# Patient Record
Sex: MALE | Race: WHITE | NOT HISPANIC OR LATINO | Employment: STUDENT | URBAN - METROPOLITAN AREA
[De-identification: names, ages, dates, MRNs, and addresses within clinical notes are randomized per-mention and may not be internally consistent; named-entity substitution may affect disease eponyms.]

---

## 2022-06-24 ENCOUNTER — OCCUPATIONAL HEALTH (OUTPATIENT)
Dept: URGENT CARE | Facility: CLINIC | Age: 22
End: 2022-06-24
Payer: COMMERCIAL

## 2022-06-24 DIAGNOSIS — Z11.1 ENCOUNTER FOR PPD SKIN TEST READING: Primary | ICD-10-CM

## 2022-06-24 PROCEDURE — 86580 POCT TB SKIN TEST: ICD-10-PCS | Mod: ,,, | Performed by: PHYSICIAN ASSISTANT

## 2022-06-24 PROCEDURE — 86580 TB INTRADERMAL TEST: CPT | Mod: ,,, | Performed by: PHYSICIAN ASSISTANT

## 2022-06-24 NOTE — PROGRESS NOTES
Patient presents to clinic for administration of TB skin test. Consent was completed. No contraindications existed for administration of TB skin test. TB skin test administered. Patient was advised to return to clinic in 48-72 hours to have TB skin test read.

## 2022-06-27 ENCOUNTER — OCCUPATIONAL HEALTH (OUTPATIENT)
Dept: URGENT CARE | Facility: CLINIC | Age: 22
End: 2022-06-27
Payer: COMMERCIAL

## 2022-06-27 LAB
TB INDURATION - 48 HR READ: NORMAL
TB INDURATION - 72 HR READ: 0 MM
TB SKIN TEST - 48 HR READ: NORMAL
TB SKIN TEST - 72 HR READ: NEGATIVE

## 2022-06-27 PROCEDURE — 99211 PR OFFICE/OUTPT VISIT, EST, LEVL I: ICD-10-PCS | Mod: S$PBB,,, | Performed by: FAMILY MEDICINE

## 2022-06-27 PROCEDURE — 99211 OFF/OP EST MAY X REQ PHY/QHP: CPT | Mod: S$PBB,,, | Performed by: FAMILY MEDICINE

## 2022-06-27 NOTE — PROGRESS NOTES
Pt presents to clinic for TB skin test read after 48-72 hours. Pt result read and documented as negative with 0mm induration. Documentation printed and given to pt. Pt left clinic in good spirits with no questions or concerns.

## 2023-05-26 ENCOUNTER — HOSPITAL ENCOUNTER (EMERGENCY)
Facility: HOSPITAL | Age: 23
Discharge: HOME | End: 2023-05-26
Attending: EMERGENCY MEDICINE
Payer: COMMERCIAL

## 2023-05-26 ENCOUNTER — OFFICE VISIT (OUTPATIENT)
Dept: URGENT CARE | Facility: CLINIC | Age: 23
End: 2023-05-26
Payer: COMMERCIAL

## 2023-05-26 VITALS
HEART RATE: 62 BPM | DIASTOLIC BLOOD PRESSURE: 88 MMHG | RESPIRATION RATE: 17 BRPM | OXYGEN SATURATION: 98 % | SYSTOLIC BLOOD PRESSURE: 128 MMHG | TEMPERATURE: 98 F

## 2023-05-26 VITALS
RESPIRATION RATE: 18 BRPM | TEMPERATURE: 98 F | DIASTOLIC BLOOD PRESSURE: 85 MMHG | BODY MASS INDEX: 27.59 KG/M2 | HEART RATE: 80 BPM | WEIGHT: 215 LBS | OXYGEN SATURATION: 98 % | HEIGHT: 74 IN | SYSTOLIC BLOOD PRESSURE: 131 MMHG

## 2023-05-26 DIAGNOSIS — R07.9 CHEST PAIN, UNSPECIFIED TYPE: Primary | ICD-10-CM

## 2023-05-26 DIAGNOSIS — R07.9 CHEST PAIN: ICD-10-CM

## 2023-05-26 DIAGNOSIS — R94.31 ABNORMAL EKG: ICD-10-CM

## 2023-05-26 DIAGNOSIS — R07.89 CHEST WALL PAIN: Primary | ICD-10-CM

## 2023-05-26 LAB
ALBUMIN SERPL-MCNC: 4.5 G/DL (ref 3.5–5)
ALBUMIN/GLOB SERPL: 1.5 RATIO (ref 1.1–2)
ALP SERPL-CCNC: 81 UNIT/L (ref 40–150)
ALT SERPL-CCNC: 34 UNIT/L (ref 0–55)
AST SERPL-CCNC: 32 UNIT/L (ref 5–34)
BASOPHILS # BLD AUTO: 0.03 X10(3)/MCL
BASOPHILS NFR BLD AUTO: 0.5 %
BILIRUBIN DIRECT+TOT PNL SERPL-MCNC: 0.9 MG/DL
BUN SERPL-MCNC: 13.1 MG/DL (ref 8.9–20.6)
CALCIUM SERPL-MCNC: 9.4 MG/DL (ref 8.4–10.2)
CHLORIDE SERPL-SCNC: 106 MMOL/L (ref 98–107)
CO2 SERPL-SCNC: 25 MMOL/L (ref 22–29)
CREAT SERPL-MCNC: 0.97 MG/DL (ref 0.73–1.18)
EKG 12-LEAD: NORMAL
EOSINOPHIL # BLD AUTO: 0.06 X10(3)/MCL (ref 0–0.9)
EOSINOPHIL NFR BLD AUTO: 1 %
ERYTHROCYTE [DISTWIDTH] IN BLOOD BY AUTOMATED COUNT: 13 % (ref 11.5–17)
GFR SERPLBLD CREATININE-BSD FMLA CKD-EPI: >60 MLS/MIN/1.73/M2
GLOBULIN SER-MCNC: 3 GM/DL (ref 2.4–3.5)
GLUCOSE SERPL-MCNC: 90 MG/DL (ref 74–100)
HCT VFR BLD AUTO: 48.1 % (ref 42–52)
HGB BLD-MCNC: 15.9 G/DL (ref 14–18)
IMM GRANULOCYTES # BLD AUTO: 0.01 X10(3)/MCL (ref 0–0.04)
IMM GRANULOCYTES NFR BLD AUTO: 0.2 %
LYMPHOCYTES # BLD AUTO: 1.8 X10(3)/MCL (ref 0.6–4.6)
LYMPHOCYTES NFR BLD AUTO: 30 %
MCH RBC QN AUTO: 28.7 PG (ref 27–31)
MCHC RBC AUTO-ENTMCNC: 33.1 G/DL (ref 33–36)
MCV RBC AUTO: 86.8 FL (ref 80–94)
MONOCYTES # BLD AUTO: 0.61 X10(3)/MCL (ref 0.1–1.3)
MONOCYTES NFR BLD AUTO: 10.2 %
NEUTROPHILS # BLD AUTO: 3.49 X10(3)/MCL (ref 2.1–9.2)
NEUTROPHILS NFR BLD AUTO: 58.1 %
NRBC BLD AUTO-RTO: 0 %
PLATELET # BLD AUTO: 226 X10(3)/MCL (ref 130–400)
PMV BLD AUTO: 9.8 FL (ref 7.4–10.4)
POTASSIUM SERPL-SCNC: 4.4 MMOL/L (ref 3.5–5.1)
PR INTERVAL: NORMAL
PROT SERPL-MCNC: 7.5 GM/DL (ref 6.4–8.3)
PRT AXES: NORMAL
QRS DURATION: NORMAL
QT/QTC: NORMAL
RBC # BLD AUTO: 5.54 X10(6)/MCL (ref 4.7–6.1)
SODIUM SERPL-SCNC: 138 MMOL/L (ref 136–145)
TROPONIN I SERPL-MCNC: <0.01 NG/ML (ref 0–0.04)
VENTRICULAR RATE: NORMAL
WBC # SPEC AUTO: 6 X10(3)/MCL (ref 4.5–11.5)

## 2023-05-26 PROCEDURE — 25500020 PHARM REV CODE 255: Performed by: PHYSICIAN ASSISTANT

## 2023-05-26 PROCEDURE — 80053 COMPREHEN METABOLIC PANEL: CPT | Performed by: PHYSICIAN ASSISTANT

## 2023-05-26 PROCEDURE — 99213 OFFICE O/P EST LOW 20 MIN: CPT | Mod: ,,, | Performed by: FAMILY MEDICINE

## 2023-05-26 PROCEDURE — 93000 POCT EKG 12-LEAD: ICD-10-PCS | Mod: ,,, | Performed by: FAMILY MEDICINE

## 2023-05-26 PROCEDURE — 63600175 PHARM REV CODE 636 W HCPCS: Performed by: EMERGENCY MEDICINE

## 2023-05-26 PROCEDURE — 99285 EMERGENCY DEPT VISIT HI MDM: CPT | Mod: 25

## 2023-05-26 PROCEDURE — 85025 COMPLETE CBC W/AUTO DIFF WBC: CPT | Performed by: PHYSICIAN ASSISTANT

## 2023-05-26 PROCEDURE — 93000 ELECTROCARDIOGRAM COMPLETE: CPT | Mod: ,,, | Performed by: FAMILY MEDICINE

## 2023-05-26 PROCEDURE — 96374 THER/PROPH/DIAG INJ IV PUSH: CPT

## 2023-05-26 PROCEDURE — 84484 ASSAY OF TROPONIN QUANT: CPT | Performed by: PHYSICIAN ASSISTANT

## 2023-05-26 PROCEDURE — 99213 PR OFFICE/OUTPT VISIT, EST, LEVL III, 20-29 MIN: ICD-10-PCS | Mod: ,,, | Performed by: FAMILY MEDICINE

## 2023-05-26 PROCEDURE — 93005 ELECTROCARDIOGRAM TRACING: CPT

## 2023-05-26 RX ORDER — KETOROLAC TROMETHAMINE 30 MG/ML
30 INJECTION, SOLUTION INTRAMUSCULAR; INTRAVENOUS
Status: COMPLETED | OUTPATIENT
Start: 2023-05-26 | End: 2023-05-26

## 2023-05-26 RX ORDER — INDOMETHACIN 50 MG/1
50 CAPSULE ORAL
Qty: 15 CAPSULE | Refills: 0 | Status: SHIPPED | OUTPATIENT
Start: 2023-05-26

## 2023-05-26 RX ADMIN — IOPAMIDOL 100 ML: 755 INJECTION, SOLUTION INTRAVENOUS at 01:05

## 2023-05-26 RX ADMIN — KETOROLAC TROMETHAMINE 30 MG: 30 INJECTION, SOLUTION INTRAMUSCULAR; INTRAVENOUS at 03:05

## 2023-05-26 NOTE — ED PROVIDER NOTES
"Encounter Date: 5/26/2023    SCRIBE #1 NOTE: I, Marely Diggs, am scribing for, and in the presence of,  Kishan Barrow III, MD. I have scribed the following portions of the note - Other sections scribed: HPI,ROS,PE.     History     Chief Complaint   Patient presents with    Chest Pain     Pt c/o cp x3 days. Reports pain worse with deep breath, denies sob. Sent here by  for PE workup. Denies recent cough.      23-year-old  male with no past medical history presents to ED with mother nearby c/o constant CP onset 3x days. Pt reports recent trip to [place], states on the first night there woke with CP. States certain positions such as lying supine or on the left side would exacerbate pain as well as deep inspiration, laughing, or minimal exertion. He reports taking acetaminophen with mild relief. He denies N/V/D, wheezing, and leg swelling. Mother notes grandfather with hx of blood clots. C    The history is provided by the patient. No  was used.   Chest Pain  The current episode started several days ago. Chest pain occurs constantly. The chest pain is unchanged. The pain is associated with breathing, coughing and exertion. Quality: "Pain" Chest pain is worsened by certain positions and deep breathing. Pertinent negatives for primary symptoms include no shortness of breath, no wheezing, no nausea and no vomiting. There are no known risk factors.   Review of patient's allergies indicates:  No Known Allergies  Past Medical History:   Diagnosis Date    Known health problems: none      Past Surgical History:   Procedure Laterality Date    TONSILLECTOMY       No family history on file.  Social History     Tobacco Use    Smoking status: Never    Smokeless tobacco: Never     Review of Systems   Respiratory:  Negative for shortness of breath and wheezing.    Cardiovascular:  Positive for chest pain. Negative for leg swelling.   Gastrointestinal:  Negative for diarrhea, nausea and vomiting.   Musculoskeletal:  " Negative for myalgias.     Physical Exam     Initial Vitals [05/26/23 1236]   BP Pulse Resp Temp SpO2   (!) 153/94 64 18 97.8 °F (36.6 °C) 97 %      MAP       --         Physical Exam    Nursing note and vitals reviewed.  Constitutional: No distress.   HENT:   Head: Normocephalic and atraumatic.   Neck: Trachea normal.   Cardiovascular:  Normal rate and regular rhythm.           No murmur heard.  Pulmonary/Chest: Breath sounds normal. No respiratory distress.   Abdominal: Abdomen is soft. Bowel sounds are normal. He exhibits no distension. There is no abdominal tenderness.   Musculoskeletal:         General: Normal range of motion.      Lumbar back: Normal.     Neurological: He is alert and oriented to person, place, and time. He has normal strength. No cranial nerve deficit.   Skin: Skin is warm and dry. No rash noted.   Psychiatric: He has a normal mood and affect. Judgment normal.       ED Course   Procedures  Labs Reviewed   TROPONIN I - Normal   CBC W/ AUTO DIFFERENTIAL    Narrative:     The following orders were created for panel order CBC auto differential.  Procedure                               Abnormality         Status                     ---------                               -----------         ------                     CBC with Differential[[id number]]                            Final result                 Please view results for these tests on the individual orders.   COMPREHENSIVE METABOLIC PANEL   CBC WITH DIFFERENTIAL     EKG Readings: (Independently Interpreted)   Initial Reading: No STEMI. Previous EKG: Compared with most recent EKG Rhythm: Normal Sinus Rhythm. Heart Rate: 73. Ectopy: No Ectopy. Conduction: Normal. ST Segments: Normal ST Segments. T Waves: Normal.   EKG done at 1236:    ECG Results              EKG 12-lead (Final result)  Result time 05/26/23 15:14:18      Final result by Interface, Lab In [facility] (05/26/23 15:14:18)                   Narrative:    Test Reason :  R07.9,    Vent. Rate : 073 BPM     Atrial Rate : 073 BPM     P-R Int : 144 ms          QRS Dur : 098 ms      QT Int : 374 ms       P-R-T Axes : 089 108 041 degrees     QTc Int : 412 ms    Normal sinus rhythm  Rightward axis  Borderline Abnormal ECG  No previous ECGs available  Confirmed by David Ogden MD ([id number]) on 5/26/2023 3:14:11 PM    Referred By:             Confirmed By:David Ogden MD                                  Imaging Results              CTA Chest Non-Coronary (PE Studies) (Final result)  Result time 05/26/23 13:14:30      Final result by Mk Daniel MD (05/26/23 13:14:30)                   Narrative:    EXAMINATION  CTA CHEST NON CORONARY (PE STUDIES)    CLINICAL HISTORY  Pulmonary embolism (PE) suspected, high prob;    TECHNIQUE  Post-contrast helical-acquisition CT images were obtained, with bolus timing to pulmonary arterial system for purposes of PE protocol CTA.  Multiplanar reconstructions, including MIP images, were accomplished by a CT technologist at a separate workstation, pushed to PACS for physician review.    CONTRAST  IV: ISOVUE-370, 100 mL    COMPARISON  None available at the time of initial interpretation.    FINDINGS  Images were reviewed in soft tissue, lung, and bone windows.    Exam quality: adequate for evaluation    Lines/tubes: none visualized    Pulmonary Vessels: No central or large segmental filling defect.    Cardiovascular: No suggestion of right heart strain; the RV:LV ratio is <1. No significant heart chamber enlargement. There is no pericardial effusion. No focal contour irregularity or intraluminal abnormality is appreciated involving the visualized aorta and branch vessels.    Lungs/Pleura: Central airways are patent. No acute airspace consolidation or suspicious focal lesion. No pleural thickening, significant effusion, or evidence of pneumothorax.    Other Findings: No pathologic makayla enlargement or necrotic process. The visualized thyroid is unremarkable.  No acute or suspicious focal abnormality through the visualized upper abdomen. No abnormality of the thoracic wall soft tissues. No acute osseous displacement or destructive focal lesion.    IMPRESSION  No CT evidence of pulmonary thromboembolism or other acute intrathoracic process.    RADIATION DOSE  Automated tube current modulation, weight-based exposure dosing, and/or iterative reconstruction technique utilized to reach lowest reasonably achievable exposure rate.    DLP: 355 mGy*cm      Electronically signed by: Mk Daniel  Date:    05/26/2023  Time:    13:14                                     Medications   iopamidoL (ISOVUE-370) injection 100 mL (100 mLs Intravenous Given 5/26/23 1307)   ketorolac injection 30 mg (30 mg Intravenous Given 5/26/23 1535)     Medical Decision Making  The differential diagnosis includes, but is not limited to: pleurisy, PE, Nonspecific chest pain, and myocarditis.     EKG without abnormality cardiac enzymes also normal rules out myocarditis CT chest done secondary to risk factors of recent long trip and acute chest pain in his negative there is a pleuritic nature of the pain most likely musculoskeletal pain no vital sign abnormality normal exam resting comfortably will place on anti-inflammatories and discharge    Amount and/or Complexity of Data Reviewed  Independent Historian: parent     Details: Mother notes grandfather had hx of blood clots.  Labs: ordered. Decision-making details documented in ED Course.  Radiology: ordered. Decision-making details documented in ED Course.  ECG/medicine tests: ordered. Decision-making details documented in ED Course.              Scribe Attestation:   Scribe #1: I performed the above scribed service and the documentation accurately describes the services I performed. I attest to the accuracy of the note.    Attending Attestation:           Physician Attestation for Scribe:  Physician Attestation Statement for Scribe #1: IKishan  MD KEVIN, reviewed documentation, as scribed by Marely Diggs in my presence, and it is both accurate and complete.                        Clinical Impression:   Final diagnoses:  [R07.9] Chest pain  [R07.89] Chest wall pain (Primary)        ED Disposition Condition    Discharge Stable          ED Prescriptions       Medication Sig Dispense Start Date End Date Auth. Provider    indomethacin (INDOCIN) 50 MG capsule Take 1 capsule (50 mg total) by mouth 3 (three) times daily with meals. 15 capsule 5/26/2023 -- Kishan Barrow III, MD          Follow-up Information       Follow up With Specialties Details Why Contact Info    PCP  In 3 days      [facility]  In 1 week if you do not have a primary care you may call this number to [facility] one Please call [phone] to get established with a primary care             Kishan Barrow III, MD  05/26/23 7897

## 2023-05-26 NOTE — FIRST PROVIDER EVALUATION
Medical screening examination initiated.  I have conducted a focused provider triage encounter, findings are as follows:    Brief history of present illness:  23-year-old male presents to ED with left-sided chest pain just below the nipple area for the past 2 days.  States recently drove to [place] when went to bed and woke up with the pain.  Denies any trauma injury or fall.  Denies any shortness of breath associated with the chest pain.  States deep inspiration and certain movements make the pain worse.  Patient denies any smoking history. Seen at  where EKG was discussed with cardiology and sent to rule out PE.    Vitals:    05/26/23 1236   BP: (!) 153/94   Pulse: 64   Resp: 18   Temp: 97.8 °F (36.6 °C)   TempSrc: Oral   SpO2: 97%       Pertinent physical exam:  Patient is awake and alert and oriented.  Ambulatory to triage.  In no acute distress.      Brief workup plan:  labs, EKG, cxr in chart, CTA    Preliminary workup initiated; this workup will be continued and followed by the physician or advanced practice provider that is assigned to the patient when roomed.

## 2023-05-26 NOTE — PROGRESS NOTES
"Subjective:      Patient ID: Rica Curran is a 23 y.o. male.    Vitals:  height is 6' 2" (1.88 m) and weight is 97.5 kg (215 lb). His oral temperature is 97.9 °F (36.6 °C). His blood pressure is 131/85 and his pulse is 80. His respiration is 18 and oxygen saturation is 98%.     Chief Complaint: Chest Pain    23-year-old male presents to clinic complaining of a 2 day history of left-sided chest pain just below the nipple area.  States he drove to [place] went to bed and woke up with the pain.  Denies any trauma injury or fall.  Denies any shortness of breath associated with the chest pain.  States deep inspiration and certain movements make the pain worse.  Pain is constant.  Patient denies any smoking history..  Smoking history.  No recent athletic or sporting competitions.  States she is also feeling some discomfort on the left side over his back as well.  Denies any rash in this area      Constitution: Negative.   HENT: Negative.     Neck: neck negative.   Cardiovascular:  Positive for chest pain.   Eyes: Negative.    Respiratory: Negative.     Gastrointestinal: Negative.    Genitourinary: Negative.    Musculoskeletal: Negative.    Skin: Negative.    Allergic/Immunologic: Negative.    Neurological: Negative.    Hematologic/Lymphatic: Negative.     Objective:     Physical Exam   Constitutional: He is oriented to person, place, and time.  Non-toxic appearance. He does not appear ill. No distress.   HENT:   Head: Normocephalic and atraumatic.   Eyes: Conjunctivae are normal.   Cardiovascular: Normal rate.   Pulmonary/Chest: Effort normal. No stridor. No respiratory distress. He has no wheezes. He has no rhonchi. He has no rales. He exhibits no tenderness (pain is not reproducible with palpation).   Abdominal: Normal appearance.   Neurological: He is alert and oriented to person, place, and time.   Skin: Skin is not diaphoretic and no rash.   Psychiatric: His behavior is normal. Mood, judgment and thought content " "normal.   Vitals reviewed.       Previous History      Review of patient's allergies indicates:  No Known Allergies    Past Medical History:   Diagnosis Date    Known health problems: none      No current outpatient medications  Past Surgical History:   Procedure Laterality Date    TONSILLECTOMY       History reviewed. No pertinent family history.    Social History     Tobacco Use    Smoking status: Never    Smokeless tobacco: Never        Physical Exam      Vital Signs Reviewed   /85   Pulse 80   Temp 97.9 °F (36.6 °C) (Oral)   Resp 18   Ht 6' 2" (1.88 m)   Wt 97.5 kg (215 lb)   SpO2 98%   BMI 27.60 kg/m²        Procedures    Procedures     Labs     Results for orders placed or performed in visit on 05/26/23   POCT EKG 12-LEAD (NOT FOR [facility] USE)   Result Value Ref Range    EKG 12-Lead      Ventricular Rate      [place] Interval      QRS Duration      QT/QTc      PRT Axes         Assessment:     1. Chest pain, unspecified type    2. Abnormal EKG        Plan:   Recommend further evaluation in the emergency department of your choice  Chest x-ray negative  EKG right axis deviation consider right ventricular strain/pulmonary disease  R/u PE  Discussed with cardiology  Chest pain, unspecified type  -     POCT EKG 12-LEAD (NOT FOR [facility] USE)  -     X-Ray Chest PA And Lateral; Future; Expected date: 05/26/2023    Abnormal EKG                    "

## 2023-07-18 ENCOUNTER — OFFICE VISIT (OUTPATIENT)
Dept: URGENT CARE | Facility: CLINIC | Age: 23
End: 2023-07-18
Payer: COMMERCIAL

## 2023-07-18 VITALS
BODY MASS INDEX: 27.59 KG/M2 | DIASTOLIC BLOOD PRESSURE: 80 MMHG | HEIGHT: 74 IN | RESPIRATION RATE: 20 BRPM | WEIGHT: 215 LBS | SYSTOLIC BLOOD PRESSURE: 125 MMHG | OXYGEN SATURATION: 98 % | HEART RATE: 67 BPM | TEMPERATURE: 98 F

## 2023-07-18 DIAGNOSIS — L30.9 ECZEMA OF BOTH HANDS: Primary | ICD-10-CM

## 2023-07-18 PROCEDURE — 99203 PR OFFICE/OUTPT VISIT, NEW, LEVL III, 30-44 MIN: ICD-10-PCS | Mod: ,,, | Performed by: PHYSICIAN ASSISTANT

## 2023-07-18 PROCEDURE — 99203 OFFICE O/P NEW LOW 30 MIN: CPT | Mod: ,,, | Performed by: PHYSICIAN ASSISTANT

## 2023-07-18 RX ORDER — TACROLIMUS 1 MG/G
OINTMENT TOPICAL 2 TIMES DAILY
Qty: 30 G | Refills: 1 | Status: SHIPPED | OUTPATIENT
Start: 2023-07-18 | End: 2023-07-21

## 2023-07-18 RX ORDER — METHYLPREDNISOLONE 4 MG/1
TABLET ORAL
Qty: 1 EACH | Refills: 0 | Status: SHIPPED | OUTPATIENT
Start: 2023-07-18 | End: 2023-08-10

## 2023-07-18 RX ORDER — SULFAMETHOXAZOLE AND TRIMETHOPRIM 800; 160 MG/1; MG/1
1 TABLET ORAL 2 TIMES DAILY
Qty: 20 TABLET | Refills: 0 | Status: SHIPPED | OUTPATIENT
Start: 2023-07-18 | End: 2023-07-28

## 2023-07-18 NOTE — PROGRESS NOTES
"Subjective:      Patient ID: Rica Curran is a 23 y.o. male.    Vitals:  height is 6' 2" (1.88 m) and weight is 97.5 kg (215 lb). His temperature is 98.4 °F (36.9 °C). His blood pressure is 125/80 and his pulse is 67. His respiration is 20 and oxygen saturation is 98%.     Chief Complaint: Eczema (Pt symptoms starting flaring up 3-4  weeks ago, pt needs new prescription. )    HPI  right-hand dominant patient with long history of eczema reports running out of Protopic Rx cream states in the last several weeks having sweating wet hands with flare-up clear yellow weeping of bilateral hand rash presents to urgent care for medication refill.   Eczema     Additional comments: Pt symptoms starting flaring up 3-4  weeks ago, pt   needs new prescription.            Constitution: Negative for chills and fever.   Musculoskeletal:  Negative for joint pain and muscle ache.   Skin:  Positive for rash. Negative for skin thickening/induration, erythema and abscess.   Allergic/Immunologic: Negative for immunocompromised state.   Neurological:  Negative for numbness and tingling.    Objective:     Physical Exam   Constitutional: He is oriented to person, place, and time. He appears well-developed.  Non-toxic appearance. He does not appear ill. No distress.      Comments:Awake alert pleasant ambulatory male     HENT:   Head: Normocephalic. Head is without abrasion, without contusion and without laceration.   Mouth/Throat: Oropharynx is clear and moist and mucous membranes are normal.   Eyes: EOM and lids are normal.   Neck: Trachea normal and phonation normal. Neck supple.   Cardiovascular: Normal rate, regular rhythm and normal pulses.   Pulmonary/Chest: Effort normal. No respiratory distress.   Abdominal: Normal appearance.   Musculoskeletal: Normal range of motion.         General: No swelling, tenderness or deformity. Normal range of motion.        Hands:    Neurological: no focal deficit. He is alert and oriented to person, place, " and time.   Skin: Skin is warm, dry, intact, not diaphoretic and rash. Capillary refill takes less than 2 seconds. No abrasion, No burn, No erythema and No ecchymosis         Comments: Left-hand greater than right hand interdigit eczema rash flare-up purulent drainage no induration no fluctuance no sign of infection   Psychiatric: His speech is normal and behavior is normal. Mood, judgment and thought content normal.   Nursing note and vitals reviewed.    Assessment:     1. Eczema of both hands        Plan:     May restart Protopic cream to help reduce eczema flare.  May add oral steroid to help reduce eczema flare-up.  If fever develops or concern for cellulitis bacterial infection in the next 3-5 days recommend hold and wait Bactrim antibiotic backup coverage.  Recommend follow-up with primary care physician or dermatologist for continued long-term eczema care planning.  Eczema of both hands    Other orders  -     methylPREDNISolone (MEDROL DOSEPACK) 4 mg tablet; use as directed  Dispense: 1 each; Refill: 0  -     tacrolimus (PROTOPIC) 0.1 % ointment; Apply topically 2 (two) times daily. for 14 days  Dispense: 30 g; Refill: 1  -     sulfamethoxazole-trimethoprim 800-160mg (BACTRIM DS) 800-160 mg Tab; Take 1 tablet by mouth 2 (two) times daily. for 10 days  Dispense: 20 tablet; Refill: 0

## 2023-07-18 NOTE — PATIENT INSTRUCTIONS
May restart Protopic cream to help reduce eczema flare.  May add oral steroid to help reduce eczema flare-up.  If fever develops or concern for cellulitis bacterial infection in the next 3-5 days recommend hold and wait Bactrim antibiotic backup coverage.  Recommend follow-up with primary care physician or dermatologist for continued long-term eczema care planning.

## 2023-07-21 RX ORDER — TACROLIMUS 1 MG/G
OINTMENT TOPICAL
Qty: 30 G | Refills: 1 | Status: SHIPPED | OUTPATIENT
Start: 2023-07-21

## 2023-08-10 ENCOUNTER — OFFICE VISIT (OUTPATIENT)
Dept: URGENT CARE | Facility: CLINIC | Age: 23
End: 2023-08-10
Payer: COMMERCIAL

## 2023-08-10 VITALS
HEART RATE: 65 BPM | RESPIRATION RATE: 12 BRPM | WEIGHT: 215 LBS | DIASTOLIC BLOOD PRESSURE: 70 MMHG | OXYGEN SATURATION: 96 % | HEIGHT: 74 IN | SYSTOLIC BLOOD PRESSURE: 136 MMHG | TEMPERATURE: 98 F | BODY MASS INDEX: 27.59 KG/M2

## 2023-08-10 DIAGNOSIS — L23.7 POISON IVY DERMATITIS: Primary | ICD-10-CM

## 2023-08-10 PROCEDURE — 99214 OFFICE O/P EST MOD 30 MIN: CPT | Mod: 25,S$GLB,, | Performed by: PHYSICIAN ASSISTANT

## 2023-08-10 PROCEDURE — 96372 THER/PROPH/DIAG INJ SC/IM: CPT | Mod: S$GLB,,, | Performed by: PHYSICIAN ASSISTANT

## 2023-08-10 PROCEDURE — 96372 PR INJECTION,THERAP/PROPH/DIAG2ST, IM OR SUBCUT: ICD-10-PCS | Mod: S$GLB,,, | Performed by: PHYSICIAN ASSISTANT

## 2023-08-10 PROCEDURE — 99214 PR OFFICE/OUTPT VISIT, EST, LEVL IV, 30-39 MIN: ICD-10-PCS | Mod: 25,S$GLB,, | Performed by: PHYSICIAN ASSISTANT

## 2023-08-10 RX ORDER — PREDNISONE 20 MG/1
TABLET ORAL
Qty: 7 TABLET | Refills: 0 | Status: SHIPPED | OUTPATIENT
Start: 2023-08-10

## 2023-08-10 RX ORDER — TRIAMCINOLONE ACETONIDE 1 MG/G
CREAM TOPICAL 2 TIMES DAILY
Qty: 28.4 G | Refills: 0 | Status: SHIPPED | OUTPATIENT
Start: 2023-08-10

## 2023-08-10 NOTE — PROGRESS NOTES
"Subjective:      Patient ID: Rica Curran is a 23 y.o. male.    Vitals:  height is 6' 2" (1.88 m) and weight is 97.5 kg (215 lb). His tympanic temperature is 98 °F (36.7 °C). His blood pressure is 136/70 and his pulse is 65. His respiration is 12 and oxygen saturation is 96%.     Chief Complaint: Rash    Patient presents today with a rash to the upper legs, arms and ankles. Patient states it is either poison ivy or poison oak. Patient states this has been going on for 10 days. Patient was in the woods playing disc golf so he knows exactly where he got it. Has had no relief with otc creams and benadryl.     Rash  This is a new problem. The current episode started 1 to 4 weeks ago (10 days). The problem has been gradually worsening since onset. The affected locations include the torso, left arm, right arm, left upper leg and right upper leg. The rash is characterized by redness, swelling, itchiness, draining and scaling. Past treatments include topical steroids and anti-itch cream (benedryl, opads). The treatment provided mild relief. His past medical history is significant for eczema.       Constitution: Negative.   HENT: Negative.     Respiratory: Negative.     Gastrointestinal: Negative.    Skin:  Positive for rash.   Allergic/Immunologic: Positive for itching.      Objective:     Physical Exam   Constitutional: He appears well-developed.  Non-toxic appearance. He does not appear ill. No distress.   HENT:   Head: Normocephalic and atraumatic.   Ears:   Right Ear: External ear normal.   Left Ear: External ear normal.   Nose: Nose normal.   Eyes: Conjunctivae and EOM are normal.   Neck: Neck supple.   Pulmonary/Chest: Effort normal.   Abdominal: Normal appearance.   Musculoskeletal: Normal range of motion.         General: Normal range of motion.   Neurological: no focal deficit. He is alert. He displays no weakness. Gait normal.   Skin: Skin is warm, dry, not diaphoretic, not pale and rash.         Comments: Scattered " vesicular clusters on bilateral legs (most severe on ankles). Area of hives on upper inside of right arm.    Psychiatric: His behavior is normal.       Assessment:     1. Poison ivy dermatitis        Plan:     Patient requested steroid injection. Discussed risks & possible side effects of steroid injection. Pt verbalized understanding of risks associated with injection and wished to proceed with treatment. Per MA, 1-2 minutes after pt received injection he became pale and stated he felt like he was going to pass out. He then lost consciousness while sitting in chair and urinated on himself. MA was able to wake patient up within 30 seconds. When I entered the room, patient was awake but still pale and diaphoretic. We were able to move him to exam table where he was able to lay down. Ice packs were applied to his head and neck. He was oriented x 3 but reported that he still felt a little light headed. Denied vision changes at that time. CN intact and PERRL. Respiratory effort normal. BP checked and was 136/70 pulse 65. Within a few minutes, pt stated that he felt much better and was no longer light headed. Pt's coloration returned to baseline. We had patient lay down for about 5 more minutes before trying to ambulate, which he did without issue. Pt stable and comfortable leaving clinic with his girlfriend.     Poison ivy dermatitis  -     predniSONE (DELTASONE) 20 MG tablet; Take 1 tablet twice daily for 2 days. Then take 1 tablet daily for 3 days.  Dispense: 7 tablet; Refill: 0  -     triamcinolone acetonide 0.1% (KENALOG) 0.1 % cream; Apply topically 2 (two) times daily. Avoid face, groin, and underarms.  Dispense: 28.4 g; Refill: 0    Other orders  -     methylPREDNISolone sod suc(PF) injection 125 mg

## 2023-08-10 NOTE — PATIENT INSTRUCTIONS
You received a steroid shot today. Please keep in mind that you should not take long term steroids unless prescribed by your physician. You should not exceed 4 steroid injections in a year and if you have multiple injections they should be spaced out adequately (atleast 3 months apart). As discussed, there are potential risks/side effects with steroid injections, which are listed below. Please contact your doctor if you experience any of these side effects.     Potential Side Effects of Steroid Injection:  Elevated blood pressure  If you have high blood pressure, please monitor your blood pressure over the next 2-3 days.   Elevated blood sugar  If you have diabetes this injection will raise your blood sugar. This will normalize over the next 2-3 days. Please make sure you monitor you blood sugar accordingly.   Increased heart rate   Increased energy or nervousness  Increased appetite, weight gain, and water retention.   Insomnia  Irritability or mood changes   Changes in vision  Seek medical attention immediately if this occurs  Seizure  Allergic reaction  Redness/flushing to the face  Temporary pain and/or bruising at the injection site  Loss of fat at the injection site, causing dimpling in the skin which could be permanent  Paleness of skin at injection site, which could be permanent  Infection at the injection site

## 2023-08-10 NOTE — LETTER
August 10, 2023      [facility]  [address]  [facility] [address]  Phone: [phone]       Patient: Rica Curran   YOB: 2000  Date of Visit: 08/10/2023    To Whom It May Concern:    Rachael Curran  was at [facility] on 08/10/2023. Please limit sun exposure until 8/14/23. Allow him to cover any areas with poison ivy rash. If you have any questions or concerns, or if I can be of further assistance, please do not hesitate to contact me.    Sincerely,    Judith Jones PA-C

## 2023-08-13 ENCOUNTER — TELEPHONE (OUTPATIENT)
Dept: URGENT CARE | Facility: CLINIC | Age: 23
End: 2023-08-13
Payer: COMMERCIAL

## 2023-08-13 NOTE — TELEPHONE ENCOUNTER
Called patient with a courtesy call after recent visit at urgent care, Pt states he is doing fine.

## 2025-01-17 ENCOUNTER — OFFICE VISIT (OUTPATIENT)
Dept: URGENT CARE | Facility: CLINIC | Age: 25
End: 2025-01-17
Payer: COMMERCIAL

## 2025-01-17 VITALS
TEMPERATURE: 97 F | BODY MASS INDEX: 27.77 KG/M2 | DIASTOLIC BLOOD PRESSURE: 73 MMHG | SYSTOLIC BLOOD PRESSURE: 128 MMHG | WEIGHT: 216.38 LBS | HEART RATE: 58 BPM | OXYGEN SATURATION: 98 % | HEIGHT: 74 IN | RESPIRATION RATE: 18 BRPM

## 2025-01-17 DIAGNOSIS — B35.4 TINEA CORPORIS: Primary | ICD-10-CM

## 2025-01-17 PROCEDURE — 99213 OFFICE O/P EST LOW 20 MIN: CPT | Mod: ,,,

## 2025-01-17 RX ORDER — FLUCONAZOLE 150 MG/1
150 TABLET ORAL WEEKLY
Qty: 3 TABLET | Refills: 0 | Status: SHIPPED | OUTPATIENT
Start: 2025-01-17 | End: 2025-02-01

## 2025-01-17 NOTE — PROGRESS NOTES
"Subjective:      Patient ID: Rica Curran is a 24 y.o. male.    Vitals:  height is 6' 2" (1.88 m) and weight is 98.2 kg (216 lb 6.4 oz). His tympanic temperature is 97.1 °F (36.2 °C). His blood pressure is 128/73 and his pulse is 58 (abnormal). His respiration is 18 and oxygen saturation is 98%.     Chief Complaint: Skin Problem     Patient is a 24 y.o. male who presents to urgent care with complaints of fungal skin infection/ringworm.  Patient reports he noticed 1 central ringworm on the right side of his lateral torso that began after coming home from a hunting trip, where he stayed in a camp, he does report dogs were there.  He believes he may have developed ringworms from there.  He has had ringworm in the past and high school and reports they appears similar.  After the 1st central ringworm he has noticed multiple other lesions pop up on his shoulder, neck, feet, other areas of the torso.  He denies any significant pain, pruritus, fevers, drainage.  He reports the lesions all initially began as central bumps and then they begin to become circular, begin to have flaking and scaling with central clearing in a raised border.  He reports he has used ointment for the last 4 days or so with no relief.  He is requesting oral medication.      Skin:  Negative for erythema.      Objective:     Physical Exam   Constitutional: He is oriented to person, place, and time. He appears well-developed.   HENT:   Head: Normocephalic and atraumatic. Head is without abrasion, without contusion and without laceration.   Ears:   Right Ear: External ear normal.   Left Ear: External ear normal.   Nose: Nose normal.   Mouth/Throat: Oropharynx is clear and moist and mucous membranes are normal.   Eyes: Conjunctivae, EOM and lids are normal. Pupils are equal, round, and reactive to light.   Neck: Trachea normal and phonation normal. Neck supple.   Cardiovascular: Normal rate, regular rhythm and normal heart sounds.   Pulmonary/Chest: Effort " "normal and breath sounds normal. No stridor. No respiratory distress.   Musculoskeletal: Normal range of motion.         General: Normal range of motion.   Neurological: He is alert and oriented to person, place, and time.   Skin: Skin is warm, dry, intact and no rash. Capillary refill takes less than 2 seconds. lesion No abrasion, No burn, No bruising, No erythema and No ecchymosis         Comments: There are multiple lesions noted with annular scaly plaques, raised borders, flaking, 1 healing lesion to the right lateral torso, few lesions appearing to the chest and shoulders, no erythema, no vesicular lesions, no induration or fluctuance present, no drainage   Psychiatric: His speech is normal and behavior is normal. Judgment and thought content normal.   Nursing note and vitals reviewed.      Assessment:     1. Tinea corporis        Plan:       Tinea corporis  -     fluconazole (DIFLUCAN) 150 MG Tab; Take 1 tablet (150 mg total) by mouth once a week. for 3 doses  Dispense: 3 tablet; Refill: 0        Due to the extensive amount of skin lesions, no relief with topical therapy, patient is requesting oral options.  Will send in Diflucan once weekly.       What are ringworm, athlete's foot, and jock itch?   These are all skin infections caused by a fungus. These types of fungal infections are also called "tinea."  These fungal infections are also sometimes called "ringworm." This is because they often cause a ring-shaped rash on the skin (picture 1). But a ring-shaped rash is not always there. Depending on where the infection is, it can look different:  ?People with athlete's foot might instead have moist, raw skin between their toes, or flaking skin on the bottoms of their feet (picture 2 and picture 3).  ?People with jock itch often just have a rash on their groin. It usually starts in the fold where the thigh meets the groin area (picture 4).  ?Sometimes, especially in children, the fungus can infect the scalp. It " can look like a bald spot or a round flaky patch of skin on the scalp (picture 5 and picture 6).  How are fungal infections spread?   You can catch fungal infections through skin-to-skin contact with a person who is infected. You can also catch them from an infected animal, such as a dog or cat.  You can also get the infections from places where the fungus might be, such as:  ?A shower stall  ?A locker room floor  ?The area near a pool  If you have a fungal infection on 1 part of your body, you can also spread it to other parts. For instance, a fungal infection on your feet could spread to your groin.  How are fungal infections treated?   The treatment depends on which body part is affected:  ?If you have a fungal infection on your scalp, you take pills to kill the fungus. Treatment for scalp infections usually lasts 1 to 3 months.  ?If you have a fungal infection on your feet, groin, or another body part, most of the time, you do not need pills. Instead, you can use a special gel, cream, lotion, or powder to kill the fungus. Treatment with these products lasts 2 to 4 weeks.  ?If you have a fungal infection on your groin and on your feet, you treat both infections at the same time. If you don't, the infection on your feet can spread to your groin again.  What problems should I watch for?   If you are being treated for a fungal infection, call your doctor or nurse for advice if:  ?The infection spreads.  ?You have any of these symptoms:  Swelling, redness, or warmth around the infected area  Pain when touching the area  Fever of 100.4°F (38°C) or higher, or chills  ?The infection doesn't go away after treatment.  How can I prevent getting a fungal infection again?   If someone in your home had a fungal infection on their scalp:  ?Get rid of any kaminski, brushes, barrettes, or other hair products that could have the fungus on them.  ?Make sure a doctor or nurse checks everyone in the house for a fungal infection on the  scalp. If anyone does have an infection, they need treatment, too.  If no one else in the house has signs of a fungal infection, the doctor or nurse might still suggest everyone use an antifungal shampoo for a few weeks.  ?If the fungal infection might have come from a pet, have the pet checked by a vet.  Some other general tips to prevent fungal infections:  ?Do not share unwashed clothes, sports gear, or towels with other people.  ?Always wear slippers or sandals when at the gym, pool, or other public areas. This includes public showers.  ?Change your socks and underwear at least once a day.  ?Keep your skin clean and dry. Always dry yourself well after swimming or showering.

## 2025-01-17 NOTE — PATIENT INSTRUCTIONS
"What are ringworm, athlete's foot, and jock itch?   These are all skin infections caused by a fungus. These types of fungal infections are also called "tinea."  These fungal infections are also sometimes called "ringworm." This is because they often cause a ring-shaped rash on the skin (picture 1). But a ring-shaped rash is not always there. Depending on where the infection is, it can look different:  ?People with athlete's foot might instead have moist, raw skin between their toes, or flaking skin on the bottoms of their feet (picture 2 and picture 3).  ?People with jock itch often just have a rash on their groin. It usually starts in the fold where the thigh meets the groin area (picture 4).  ?Sometimes, especially in children, the fungus can infect the scalp. It can look like a bald spot or a round flaky patch of skin on the scalp (picture 5 and picture 6).  How are fungal infections spread?   You can catch fungal infections through skin-to-skin contact with a person who is infected. You can also catch them from an infected animal, such as a dog or cat.  You can also get the infections from places where the fungus might be, such as:  ?A shower stall  ?A locker room floor  ?The area near a pool  If you have a fungal infection on 1 part of your body, you can also spread it to other parts. For instance, a fungal infection on your feet could spread to your groin.  How are fungal infections treated?   The treatment depends on which body part is affected:  ?If you have a fungal infection on your scalp, you take pills to kill the fungus. Treatment for scalp infections usually lasts 1 to 3 months.  ?If you have a fungal infection on your feet, groin, or another body part, most of the time, you do not need pills. Instead, you can use a special gel, cream, lotion, or powder to kill the fungus. Treatment with these products lasts 2 to 4 weeks.  ?If you have a fungal infection on your groin and on your feet, you treat both " infections at the same time. If you don't, the infection on your feet can spread to your groin again.  What problems should I watch for?   If you are being treated for a fungal infection, call your doctor or nurse for advice if:  ?The infection spreads.  ?You have any of these symptoms:  Swelling, redness, or warmth around the infected area  Pain when touching the area  Fever of 100.4°F (38°C) or higher, or chills  ?The infection doesn't go away after treatment.  How can I prevent getting a fungal infection again?   If someone in your home had a fungal infection on their scalp:  ?Get rid of any kaminski, brushes, barrettes, or other hair products that could have the fungus on them.  ?Make sure a doctor or nurse checks everyone in the house for a fungal infection on the scalp. If anyone does have an infection, they need treatment, too.  If no one else in the house has signs of a fungal infection, the doctor or nurse might still suggest everyone use an antifungal shampoo for a few weeks.  ?If the fungal infection might have come from a pet, have the pet checked by a vet.  Some other general tips to prevent fungal infections:  ?Do not share unwashed clothes, sports gear, or towels with other people.  ?Always wear slippers or sandals when at the gym, pool, or other public areas. This includes public showers.  ?Change your socks and underwear at least once a day.  ?Keep your skin clean and dry. Always dry yourself well after swimming or showering.